# Patient Record
Sex: FEMALE | Race: WHITE | NOT HISPANIC OR LATINO | URBAN - METROPOLITAN AREA
[De-identification: names, ages, dates, MRNs, and addresses within clinical notes are randomized per-mention and may not be internally consistent; named-entity substitution may affect disease eponyms.]

---

## 2023-11-30 ENCOUNTER — OFFICE VISIT (OUTPATIENT)
Dept: ORTHOPEDIC SURGERY | Facility: CLINIC | Age: 33
End: 2023-11-30
Payer: COMMERCIAL

## 2023-11-30 VITALS — HEIGHT: 63 IN | WEIGHT: 270 LBS | BODY MASS INDEX: 47.84 KG/M2

## 2023-11-30 DIAGNOSIS — G56.02 LEFT CARPAL TUNNEL SYNDROME: ICD-10-CM

## 2023-11-30 DIAGNOSIS — M25.532 ACUTE PAIN OF LEFT WRIST: ICD-10-CM

## 2023-11-30 PROCEDURE — 20526 THER INJECTION CARP TUNNEL: CPT

## 2023-11-30 PROCEDURE — 99203 OFFICE O/P NEW LOW 30 MIN: CPT

## 2023-11-30 RX ORDER — LIDOCAINE HYDROCHLORIDE 20 MG/ML
1 INJECTION, SOLUTION INFILTRATION; PERINEURAL
Status: COMPLETED | OUTPATIENT
Start: 2023-11-30 | End: 2023-11-30

## 2023-11-30 RX ORDER — TRIAMCINOLONE ACETONIDE 40 MG/ML
40 INJECTION, SUSPENSION INTRA-ARTICULAR; INTRAMUSCULAR
Status: COMPLETED | OUTPATIENT
Start: 2023-11-30 | End: 2023-11-30

## 2023-11-30 RX ADMIN — TRIAMCINOLONE ACETONIDE 40 MG: 40 INJECTION, SUSPENSION INTRA-ARTICULAR; INTRAMUSCULAR at 15:30

## 2023-11-30 RX ADMIN — LIDOCAINE HYDROCHLORIDE 1 ML: 20 INJECTION, SOLUTION INFILTRATION; PERINEURAL at 15:30

## 2023-11-30 NOTE — PROGRESS NOTES
Subjective    Patient ID: Osman Morales is a 33 y.o. female.    Chief Complaint: OTHER (LT CTS/)    HPI  33-year-old right-hand-dominant female presenting to the office for evaluation of left carpal tunnel syndrome, which has been ongoing for approximately 2 weeks.  She was seen at [facility] for this condition, and was sent for an EMG.  EMG was obtained on November 28, with evidence of left mild median neuropathy.  Presents to the office today desiring a carpal tunnel injection.  She is experiencing numbness and paresthesia of left first second and third digits.  Numbness and tingling and pain is worse at night, as well as driving.  Patient states that essentially any motion of her left hand elicits pain and increased numbness and tingling.  She has been using a carpal tunnel brace without any relief of symptoms.  She was provided with meloxicam, which provides minimal relief.    The patient's past medical, surgical, family, and social history as well as allergies and medications were reviewed and updated in the chart.    Objective   Ortho Exam  Patient is in no acute distress.  Exam of right upper extremity reveals pain-free motion at her elbow and wrist.  No evidence of thenar atrophy or intrinsic atrophy.  Positive Tinel's test, positive carpal tunnel compression test, and a positive Phalen test.    Results:  EMG results from November 28, 2023 personally reviewed, with evidence of left mild median neuropathy.  There was no evidence of ulnar neuropathy or cervical radiculopathy of the left upper extremity.    M Inj/Asp (L carpal tunnel) on 11/30/2023 3:30 PM  Indications: pain  Details: 25 G needle, volar approach  Medications: 1 mL lidocaine 20 mg/mL (2 %); 40 mg triamcinolone acetonide 40 mg/mL  Outcome: tolerated well, no immediate complications  Procedure, treatment alternatives, risks and benefits explained, specific risks discussed. Consent was given by the patient.         Assessment/Plan   Encounter  Diagnoses: Left carpal tunnel syndrome    Plan: Discussion with patient about left carpal tunnel syndrome with review of conservative and surgical treatment options.  After the risks and benefits of a left carpal tunnel injection of Kenalog/lidocaine was discussed, patient agreed to injection and tolerated well.  She will continue with her carpal tunnel brace and meloxicam.  She can apply ice as needed.  She will avoid aggravating activities.  If injection is not of benefit, she can follow-up with hand specialist Dr. Leonel Joe for discussion of possible left carpal tunnel surgery.  She will follow-up as symptoms dictate.

## 2024-06-07 ENCOUNTER — OFFICE VISIT (OUTPATIENT)
Dept: ORTHOPEDIC SURGERY | Facility: CLINIC | Age: 34
End: 2024-06-07
Payer: COMMERCIAL

## 2024-06-07 VITALS — HEIGHT: 64 IN | BODY MASS INDEX: 46.95 KG/M2 | WEIGHT: 275 LBS

## 2024-06-07 DIAGNOSIS — G56.02 LEFT CARPAL TUNNEL SYNDROME: Primary | ICD-10-CM

## 2024-06-07 DIAGNOSIS — M25.532 ACUTE PAIN OF LEFT WRIST: ICD-10-CM

## 2024-06-07 PROCEDURE — 99213 OFFICE O/P EST LOW 20 MIN: CPT

## 2024-06-07 PROCEDURE — 20605 DRAIN/INJ JOINT/BURSA W/O US: CPT

## 2024-06-07 RX ORDER — TRIAMCINOLONE ACETONIDE 40 MG/ML
20 INJECTION, SUSPENSION INTRA-ARTICULAR; INTRAMUSCULAR
Status: COMPLETED | OUTPATIENT
Start: 2024-06-07 | End: 2024-06-07

## 2024-06-07 RX ORDER — LIDOCAINE HYDROCHLORIDE 20 MG/ML
0.5 INJECTION, SOLUTION INFILTRATION; PERINEURAL
Status: COMPLETED | OUTPATIENT
Start: 2024-06-07 | End: 2024-06-07

## 2024-06-07 RX ADMIN — LIDOCAINE HYDROCHLORIDE 0.5 ML: 20 INJECTION, SOLUTION INFILTRATION; PERINEURAL at 08:47

## 2024-06-07 RX ADMIN — TRIAMCINOLONE ACETONIDE 20 MG: 40 INJECTION, SUSPENSION INTRA-ARTICULAR; INTRAMUSCULAR at 08:47

## 2024-06-07 NOTE — PROGRESS NOTES
Subjective    Patient ID: Osman Morales is a 34 y.o. female.    Chief Complaint: Follow-up of the Left Wrist    HPI  34-year-old right-hand-dominant female presenting to the office for follow-up of left carpal tunnel syndrome, which has been ongoing for approximately 7 months.  She did have an EMG that was performed at [facility] on November 28 with evidence of left median neuropathy.  I last saw patient in November 2023, when she received a carpal tunnel injection which was of benefit up until the last few weeks.  She has now noticed numbness and paresthesia of left first second and third digits.  Numbness and tingling and pain is worse at night and with driving.  She has been using a carpal tunnel brace, which is of minimal benefit.  She was provided meloxicam, which provides minimal relief.    The patient's past medical, surgical, family, and social history as well as allergies and medications were reviewed and updated in the chart.    Objective   Ortho Exam  Pleasant in no acute distress.  Exam of left upper extremity reveals pain-free motion at her elbow and wrist.  There is no evidence of thenar or intrinsic atrophy.  She does have loss of pigmentation to volar wrist at site of last injection.  Positive Tinel's, positive carpal tunnel compression and positive Phalen.    Image Results:  EMG results from November 28, 2023 personally reviewed, with evidence of left mild median neuropathy.  There was no evidence of ulnar neuropathy or cervical radiculopathy of the left upper extremity.     Assessment/Plan   Encounter Diagnoses: Left carpal tunnel syndrome, left wrist pain    Plan: Discussion with patient about continued diagnosis of left carpal tunnel syndrome with review of conservative and surgical treatment options.  She would like to defer surgical treatment at this time, but is aware that she can follow-up with hand specialist Dr. Leonel Joe for discussion about surgery at any time.  After the risk and  benefits of a left carpal tunnel injection of Kenalog/lidocaine was discussed, patient agreed to injection and tolerated well.  She can continue with her carpal tunnel brace and meloxicam.  She can apply ice as needed.  Advised to avoid aggravating activities.  She can follow-up as symptoms dictate.    M Inj/Asp (L carpal tunnel) on 6/7/2024 8:47 AM  Indications: pain  Details: 25 G needle, volar approach  Medications: 0.5 mL lidocaine 20 mg/mL (2 %); 20 mg triamcinolone acetonide 40 mg/mL  Procedure, treatment alternatives, risks and benefits explained, specific risks discussed. Consent was given by the patient.